# Patient Record
Sex: MALE | Race: WHITE | NOT HISPANIC OR LATINO | Employment: FULL TIME | URBAN - METROPOLITAN AREA
[De-identification: names, ages, dates, MRNs, and addresses within clinical notes are randomized per-mention and may not be internally consistent; named-entity substitution may affect disease eponyms.]

---

## 2020-01-30 ENCOUNTER — TRANSCRIBE ORDERS (OUTPATIENT)
Dept: ADMINISTRATIVE | Facility: HOSPITAL | Age: 35
End: 2020-01-30

## 2020-01-30 DIAGNOSIS — R22.9 LOCALIZED MASS: Primary | ICD-10-CM

## 2020-01-31 ENCOUNTER — HOSPITAL ENCOUNTER (OUTPATIENT)
Dept: ULTRASOUND IMAGING | Facility: HOSPITAL | Age: 35
Discharge: HOME | End: 2020-01-31
Payer: COMMERCIAL

## 2020-01-31 DIAGNOSIS — R22.9 LOCALIZED MASS: ICD-10-CM

## 2020-01-31 PROCEDURE — 76882 US LMTD JT/FCL EVL NVASC XTR: CPT

## 2020-06-06 ENCOUNTER — APPOINTMENT (EMERGENCY)
Dept: RADIOLOGY | Facility: HOSPITAL | Age: 35
End: 2020-06-06
Payer: COMMERCIAL

## 2020-06-06 ENCOUNTER — HOSPITAL ENCOUNTER (EMERGENCY)
Facility: HOSPITAL | Age: 35
Discharge: HOME | End: 2020-06-06
Admitting: EMERGENCY MEDICINE
Payer: COMMERCIAL

## 2020-06-06 VITALS
SYSTOLIC BLOOD PRESSURE: 119 MMHG | HEIGHT: 63 IN | RESPIRATION RATE: 16 BRPM | DIASTOLIC BLOOD PRESSURE: 78 MMHG | WEIGHT: 179 LBS | OXYGEN SATURATION: 99 % | TEMPERATURE: 98.1 F | BODY MASS INDEX: 31.71 KG/M2 | HEART RATE: 105 BPM

## 2020-06-06 DIAGNOSIS — R07.9 CHEST PAIN: Primary | ICD-10-CM

## 2020-06-06 DIAGNOSIS — E86.0 DEHYDRATION: ICD-10-CM

## 2020-06-06 LAB
ALBUMIN SERPL BCP-MCNC: 4.1 G/DL (ref 3.5–5)
ALP SERPL-CCNC: 85 U/L (ref 46–116)
ALT SERPL W P-5'-P-CCNC: 35 U/L (ref 12–78)
ANION GAP SERPL CALCULATED.3IONS-SCNC: 9 MMOL/L (ref 4–13)
AST SERPL W P-5'-P-CCNC: 21 U/L (ref 5–45)
BASOPHILS # BLD AUTO: 0.05 THOUSANDS/ΜL (ref 0–0.1)
BASOPHILS NFR BLD AUTO: 1 % (ref 0–1)
BILIRUB SERPL-MCNC: 0.32 MG/DL (ref 0.2–1)
BILIRUB UR QL STRIP: NEGATIVE
BUN SERPL-MCNC: 17 MG/DL (ref 5–25)
CALCIUM SERPL-MCNC: 8.6 MG/DL (ref 8.3–10.1)
CHLORIDE SERPL-SCNC: 102 MMOL/L (ref 100–108)
CLARITY UR: CLEAR
CO2 SERPL-SCNC: 28 MMOL/L (ref 21–32)
COLOR UR: YELLOW
CREAT SERPL-MCNC: 1.17 MG/DL (ref 0.6–1.3)
EOSINOPHIL # BLD AUTO: 0.1 THOUSAND/ΜL (ref 0–0.61)
EOSINOPHIL NFR BLD AUTO: 1 % (ref 0–6)
ERYTHROCYTE [DISTWIDTH] IN BLOOD BY AUTOMATED COUNT: 12.3 % (ref 11.6–15.1)
GFR SERPL CREATININE-BSD FRML MDRD: 80 ML/MIN/1.73SQ M
GLUCOSE SERPL-MCNC: 123 MG/DL (ref 65–140)
GLUCOSE UR STRIP-MCNC: NEGATIVE MG/DL
HCT VFR BLD AUTO: 46.3 % (ref 36.5–49.3)
HGB BLD-MCNC: 15.5 G/DL (ref 12–17)
HGB UR QL STRIP.AUTO: NEGATIVE
IMM GRANULOCYTES # BLD AUTO: 0.03 THOUSAND/UL (ref 0–0.2)
IMM GRANULOCYTES NFR BLD AUTO: 0 % (ref 0–2)
KETONES UR STRIP-MCNC: NEGATIVE MG/DL
LEUKOCYTE ESTERASE UR QL STRIP: NEGATIVE
LYMPHOCYTES # BLD AUTO: 1.25 THOUSANDS/ΜL (ref 0.6–4.47)
LYMPHOCYTES NFR BLD AUTO: 12 % (ref 14–44)
MAGNESIUM SERPL-MCNC: 1.9 MG/DL (ref 1.6–2.6)
MCH RBC QN AUTO: 29.8 PG (ref 26.8–34.3)
MCHC RBC AUTO-ENTMCNC: 33.5 G/DL (ref 31.4–37.4)
MCV RBC AUTO: 89 FL (ref 82–98)
MONOCYTES # BLD AUTO: 0.58 THOUSAND/ΜL (ref 0.17–1.22)
MONOCYTES NFR BLD AUTO: 6 % (ref 4–12)
NEUTROPHILS # BLD AUTO: 8.1 THOUSANDS/ΜL (ref 1.85–7.62)
NEUTS SEG NFR BLD AUTO: 80 % (ref 43–75)
NITRITE UR QL STRIP: NEGATIVE
NRBC BLD AUTO-RTO: 0 /100 WBCS
PH UR STRIP.AUTO: 6 [PH]
PLATELET # BLD AUTO: 231 THOUSANDS/UL (ref 149–390)
PMV BLD AUTO: 10.8 FL (ref 8.9–12.7)
POTASSIUM SERPL-SCNC: 3.7 MMOL/L (ref 3.5–5.3)
PROT SERPL-MCNC: 7.7 G/DL (ref 6.4–8.2)
PROT UR STRIP-MCNC: NEGATIVE MG/DL
RBC # BLD AUTO: 5.2 MILLION/UL (ref 3.88–5.62)
SODIUM SERPL-SCNC: 139 MMOL/L (ref 136–145)
SP GR UR STRIP.AUTO: 1.01 (ref 1–1.03)
TROPONIN I SERPL-MCNC: 0.02 NG/ML
TROPONIN I SERPL-MCNC: 0.04 NG/ML
TSH SERPL DL<=0.05 MIU/L-ACNC: 10.62 UIU/ML (ref 0.36–3.74)
UROBILINOGEN UR QL STRIP.AUTO: 0.2 E.U./DL
WBC # BLD AUTO: 10.11 THOUSAND/UL (ref 4.31–10.16)

## 2020-06-06 PROCEDURE — 99284 EMERGENCY DEPT VISIT MOD MDM: CPT | Performed by: PHYSICIAN ASSISTANT

## 2020-06-06 PROCEDURE — 84443 ASSAY THYROID STIM HORMONE: CPT | Performed by: PHYSICIAN ASSISTANT

## 2020-06-06 PROCEDURE — 36415 COLL VENOUS BLD VENIPUNCTURE: CPT | Performed by: PHYSICIAN ASSISTANT

## 2020-06-06 PROCEDURE — 99285 EMERGENCY DEPT VISIT HI MDM: CPT

## 2020-06-06 PROCEDURE — 81003 URINALYSIS AUTO W/O SCOPE: CPT | Performed by: PHYSICIAN ASSISTANT

## 2020-06-06 PROCEDURE — 84484 ASSAY OF TROPONIN QUANT: CPT | Performed by: PHYSICIAN ASSISTANT

## 2020-06-06 PROCEDURE — 85025 COMPLETE CBC W/AUTO DIFF WBC: CPT | Performed by: PHYSICIAN ASSISTANT

## 2020-06-06 PROCEDURE — 71045 X-RAY EXAM CHEST 1 VIEW: CPT

## 2020-06-06 PROCEDURE — 96360 HYDRATION IV INFUSION INIT: CPT

## 2020-06-06 PROCEDURE — 80053 COMPREHEN METABOLIC PANEL: CPT | Performed by: PHYSICIAN ASSISTANT

## 2020-06-06 PROCEDURE — 93005 ELECTROCARDIOGRAM TRACING: CPT

## 2020-06-06 PROCEDURE — 83735 ASSAY OF MAGNESIUM: CPT | Performed by: PHYSICIAN ASSISTANT

## 2020-06-06 RX ORDER — ASPIRIN 81 MG/1
324 TABLET, CHEWABLE ORAL ONCE
Status: COMPLETED | OUTPATIENT
Start: 2020-06-06 | End: 2020-06-06

## 2020-06-06 RX ADMIN — SODIUM CHLORIDE 1000 ML: 0.9 INJECTION, SOLUTION INTRAVENOUS at 15:50

## 2020-06-06 RX ADMIN — ASPIRIN 81 MG 324 MG: 81 TABLET ORAL at 15:44

## 2020-06-08 LAB
ATRIAL RATE: 91 BPM
P AXIS: 69 DEGREES
PR INTERVAL: 154 MS
QRS AXIS: 113 DEGREES
QRSD INTERVAL: 90 MS
QT INTERVAL: 352 MS
QTC INTERVAL: 432 MS
T WAVE AXIS: 36 DEGREES
VENTRICULAR RATE: 91 BPM

## 2020-06-08 PROCEDURE — 93010 ELECTROCARDIOGRAM REPORT: CPT | Performed by: INTERNAL MEDICINE

## 2021-03-10 DIAGNOSIS — Z23 ENCOUNTER FOR IMMUNIZATION: ICD-10-CM

## 2021-09-13 ENCOUNTER — OFFICE VISIT (OUTPATIENT)
Dept: URGENT CARE | Facility: CLINIC | Age: 36
End: 2021-09-13
Payer: COMMERCIAL

## 2021-09-13 VITALS
BODY MASS INDEX: 31.01 KG/M2 | WEIGHT: 175 LBS | RESPIRATION RATE: 16 BRPM | TEMPERATURE: 97 F | HEART RATE: 102 BPM | OXYGEN SATURATION: 100 % | HEIGHT: 63 IN

## 2021-09-13 DIAGNOSIS — R68.89 FLU-LIKE SYMPTOMS: Primary | ICD-10-CM

## 2021-09-13 PROCEDURE — G0382 LEV 3 HOSP TYPE B ED VISIT: HCPCS | Performed by: EMERGENCY MEDICINE

## 2021-09-13 PROCEDURE — S9083 URGENT CARE CENTER GLOBAL: HCPCS | Performed by: EMERGENCY MEDICINE

## 2021-09-13 PROCEDURE — U0003 INFECTIOUS AGENT DETECTION BY NUCLEIC ACID (DNA OR RNA); SEVERE ACUTE RESPIRATORY SYNDROME CORONAVIRUS 2 (SARS-COV-2) (CORONAVIRUS DISEASE [COVID-19]), AMPLIFIED PROBE TECHNIQUE, MAKING USE OF HIGH THROUGHPUT TECHNOLOGIES AS DESCRIBED BY CMS-2020-01-R: HCPCS | Performed by: EMERGENCY MEDICINE

## 2021-09-13 PROCEDURE — U0005 INFEC AGEN DETEC AMPLI PROBE: HCPCS | Performed by: EMERGENCY MEDICINE

## 2021-09-13 RX ORDER — PROMETHAZINE HYDROCHLORIDE 25 MG/1
TABLET ORAL
COMMUNITY
Start: 2021-05-17

## 2021-09-13 RX ORDER — LEVOTHYROXINE SODIUM 112 MCG
TABLET ORAL
COMMUNITY
Start: 2021-08-25

## 2021-09-13 NOTE — PATIENT INSTRUCTIONS
urinating less or not at all  · You have a headache with a stiff neck, and you feel tired or confused  · You have new pain or pressure in your chest     · Your symptoms, such as shortness of breath, vomiting, or diarrhea, get worse  · Your symptoms, such as fever and coughing, seem to get better, but then get worse  Contact your healthcare provider if:   · You have new muscle pain or weakness  · You have questions or concerns about your condition or care  Treatment for influenza  may include any of the following:  · Acetaminophen  decreases pain and fever  It is available without a doctor's order  Ask how much to take and how often to take it  Follow directions  Acetaminophen can cause liver damage if not taken correctly  · NSAIDs , such as ibuprofen, help decrease swelling, pain, and fever  This medicine is available with or without a doctor's order  NSAIDs can cause stomach bleeding or kidney problems in certain people  If you take blood thinner medicine, always ask your healthcare provider if NSAIDs are safe for you  Always read the medicine label and follow directions  · Antivirals  help fight a viral infection  Manage your symptoms:   · Rest  as much as you can to help you recover  · Drink liquids as directed  to help prevent dehydration  Ask how much liquid to drink each day and which liquids are best for you  Prevent the spread of the flu:   · Wash your hands often  Use soap and water  Wash your hands after you use the bathroom, change a child's diapers, or sneeze  Wash your hands before you prepare or eat food  Use gel hand cleanser when soap and water are not available  Do not touch your eyes, nose, or mouth unless you have washed your hands first        · Cover your mouth when you sneeze or cough  Cough into a tissue or the bend of your arm  · Clean shared items with a germ-killing   Clean table surfaces, doorknobs, and light switches   Do not share towels, silverware, and dishes with people who are sick  Wash bed sheets, towels, silverware, and dishes with soap and water  · Wear a mask  over your mouth and nose if you are sick or are near anyone who is sick  · Stay away from others  if you are sick  · Influenza vaccine  helps prevent influenza (flu)  Everyone older than 6 months should get a yearly influenza vaccine  Get the vaccine as soon as it is available, usually in September or October each year  Follow up with your healthcare provider as directed:  Write down your questions so you remember to ask them during your visits  © 2017 [address] Information is for End User's use only and may not be sold, redistributed or otherwise used for commercial purposes  All illustrations and images included in CareNotes® are the copyrighted property of [employer]  or [employer] Beard  The above information is an  only  It is not intended as medical advice for individual conditions or treatments  Talk to your doctor, nurse or pharmacist before following any medical regimen to see if it is safe and effective for you  [address]     Your healthcare provider and/or public health staff have evaluated you and have determined that you do not need to be hospitalized at this time  At this time you can be isolated at home where you will be monitored by staff from your local or state health department  You should carefully follow the prevention and isolation steps below until a healthcare provider or local or state health department says that you can return to your normal activities  Stay home except to get medical care     People who are mildly ill with COVID-19 are able to isolate at home during their illness  You should restrict activities outside your home, except for getting medical care  Do not go to work, school, or public areas   Avoid using public transportation, ride-sharing, or taxis  Separate yourself from other people and animals in your home     People: As much as possible, you should stay in a specific room and away from other people in your home  Also, you should use a separate bathroom, if available  Animals: You should restrict contact with pets and other animals while you are sick with COVID-19, just like you would around other people  Although there have not been reports of pets or other animals becoming sick with COVID-19, it is still recommended that people sick with COVID-19 limit contact with animals until more information is known about the virus  When possible, have another member of your household care for your animals while you are sick  If you are sick with COVID-19, avoid contact with your pet, including petting, snuggling, being kissed or licked, and sharing food  If you must care for your pet or be around animals while you are sick, wash your hands before and after you interact with pets and wear a facemask  See COVID-19 and Animals for more information  Call ahead before visiting your doctor     If you have a medical appointment, call the healthcare provider and tell them that you have or may have COVID-19  This will help the healthcare providers office take steps to keep other people from getting infected or exposed  Wear a facemask     You should wear a facemask when you are around other people (e g , sharing a room or vehicle) or pets and before you enter a healthcare providers office  If you are not able to wear a facemask (for example, because it causes trouble breathing), then people who live with you should not stay in the same room with you, or they should wear a facemask if they enter your room  Cover your coughs and sneezes     Cover your mouth and nose with a tissue when you cough or sneeze  Throw used tissues in a lined trash can   Immediately wash your hands with soap and water for at least 20 seconds or, if soap and water are not available, clean your hands with an alcohol-based hand  that contains at least 60% alcohol  Clean your hands often     Wash your hands often with soap and water for at least 20 seconds, especially after blowing your nose, coughing, or sneezing; going to the bathroom; and before eating or preparing food  If soap and water are not readily available, use an alcohol-based hand  with at least 60% alcohol, covering all surfaces of your hands and rubbing them together until they feel dry  Soap and water are the best option if hands are visibly dirty  Avoid touching your eyes, nose, and mouth with unwashed hands  Avoid sharing personal household items     You should not share dishes, drinking glasses, cups, eating utensils, towels, or bedding with other people or pets in your home  After using these items, they should be washed thoroughly with soap and water  Clean all high-touch surfaces everyday     High touch surfaces include counters, tabletops, doorknobs, bathroom fixtures, toilets, phones, keyboards, tablets, and bedside tables  Also, clean any surfaces that may have blood, stool, or body fluids on them  Use a household cleaning spray or wipe, according to the label instructions  Labels contain instructions for safe and effective use of the cleaning product including precautions you should take when applying the product, such as wearing gloves and making sure you have good ventilation during use of the product  Monitor your symptoms     Seek prompt medical attention if your illness is worsening (e g , difficulty breathing)  Before seeking care, call your healthcare provider and tell them that you have, or are being evaluated for, COVID-19  Put on a facemask before you enter the facility  These steps will help the healthcare providers office to keep other people in the office or waiting room from getting infected or exposed   Ask your healthcare provider to call the local or state health department  Persons who are placed under active monitoring or facilitated self-monitoring should follow instructions provided by their local health department or occupational health professionals, as appropriate  If you have a medical emergency and need to call 911, notify the dispatch personnel that you have, or are being evaluated for COVID-19  If possible, put on a facemask before emergency medical services arrive  Discontinuing home isolation     Patients with confirmed COVID-19 should remain under home isolation precautions until the risk of secondary transmission to others is thought to be low  The decision to discontinue home isolation precautions should be made on a case-by-case basis, in consultation with healthcare providers and state and local health departments       Source: [employer]        Proceed to ER if symptoms worsen

## 2021-09-13 NOTE — PROGRESS NOTES
[facility]        NAME: Ariel Oh is a 43 y o  male  : 1985    MRN: [id number]  DATE: 2021  TIME: 7:58 PM    Assessment and Plan   Flu-like symptoms [R68 89]  1  Flu-like symptoms  Novel Coronavirus (Covid-19),PCR [facility] - Office Collection         Patient Instructions     Patient Instructions     You have been diagnosed with a flu-like illness, and your symptoms should resolve over the next 7 to 10 days with the treatments recommended today  If they do not, it is possible that you have developed a bacterial infection and you should return  If you were to take an antibiotic while you are still in the viral stage, you will not get better any faster, but could kill off  good germs in your body as well as make germs  resistant to the antibiotic  Take an expectorant - guaifenesin should be the only ingredient - during the day, and the cough suppressant (ex  Robitussin DM or Tessalon) if needed at night only  Take Zinc 12 5 to 15 mg every 2 - 3 hrs while awake for the next few days  You may take Cold Malik (13 3 mg of Zinc) or split a 25 mg Zinc tablet or lozenge in two or a 50 mg into four to get the proper dose  The total daily dose of Zinc should exceed 75 mg per day  You should also take Quercetin 500 mg twice daily  May take Flonase as discussed  You may also take vitamin D 3 2000 i u s per day for the next 1 week  You may also take a decongestant like Sudafed, unless you have hypertension or cardiac disease  You may take Imodium for diarrhea according to package instructions  Flu-like illness   AMBULATORY CARE:   Flu-like illness is an infection caused by a virus  The flu is easily spread when an infected person coughs, sneezes, or has close contact with others  You may be able to spread the flu to others for 1 week or longer after signs or symptoms appear     Common signs and symptoms include the following:   · Fever and chills    · Headaches, body aches, and muscle or joint pain    · Cough, runny nose, and sore throat    · Loss of appetite, nausea, vomiting, or diarrhea    · Tiredness    · Trouble breathing  Call 911 for any of the following:   · You have trouble breathing, and your lips look purple or blue  · You have a seizure  Seek care immediately if:   · You are dizzy, or you are urinating less or not at all  · You have a headache with a stiff neck, and you feel tired or confused  · You have new pain or pressure in your chest     · Your symptoms, such as shortness of breath, vomiting, or diarrhea, get worse  · Your symptoms, such as fever and coughing, seem to get better, but then get worse  Contact your healthcare provider if:   · You have new muscle pain or weakness  · You have questions or concerns about your condition or care  Treatment for influenza  may include any of the following:  · Acetaminophen  decreases pain and fever  It is available without a doctor's order  Ask how much to take and how often to take it  Follow directions  Acetaminophen can cause liver damage if not taken correctly  · NSAIDs , such as ibuprofen, help decrease swelling, pain, and fever  This medicine is available with or without a doctor's order  NSAIDs can cause stomach bleeding or kidney problems in certain people  If you take blood thinner medicine, always ask your healthcare provider if NSAIDs are safe for you  Always read the medicine label and follow directions  · Antivirals  help fight a viral infection  Manage your symptoms:   · Rest  as much as you can to help you recover  · Drink liquids as directed  to help prevent dehydration  Ask how much liquid to drink each day and which liquids are best for you  Prevent the spread of the flu:   · Wash your hands often  Use soap and water  Wash your hands after you use the bathroom, change a child's diapers, or sneeze  Wash your hands before you prepare or eat food   Use gel hand cleanser when soap and water are not available  Do not touch your eyes, nose, or mouth unless you have washed your hands first        · Cover your mouth when you sneeze or cough  Cough into a tissue or the bend of your arm  · Clean shared items with a germ-killing   Clean table surfaces, doorknobs, and light switches  Do not share towels, silverware, and dishes with people who are sick  Wash bed sheets, towels, silverware, and dishes with soap and water  · Wear a mask  over your mouth and nose if you are sick or are near anyone who is sick  · Stay away from others  if you are sick  · Influenza vaccine  helps prevent influenza (flu)  Everyone older than 6 months should get a yearly influenza vaccine  Get the vaccine as soon as it is available, usually in September or October each year  Follow up with your healthcare provider as directed:  Write down your questions so you remember to ask them during your visits  © 2017 [address] Information is for End User's use only and may not be sold, redistributed or otherwise used for commercial purposes  All illustrations and images included in CareNotes® are the copyrighted property of [employer]  or [employer]  The above information is an  only  It is not intended as medical advice for individual conditions or treatments  Talk to your doctor, nurse or pharmacist before following any medical regimen to see if it is safe and effective for you  [address]     Your healthcare provider and/or public health staff have evaluated you and have determined that you do not need to be hospitalized at this time  At this time you can be isolated at home where you will be monitored by staff from your local or state health department  You should carefully follow the prevention and isolation steps below until a healthcare provider or local or state health department says that you can return to your normal activities  Stay home except to get medical care     People who are mildly ill with COVID-19 are able to isolate at home during their illness  You should restrict activities outside your home, except for getting medical care  Do not go to work, school, or public areas  Avoid using public transportation, ride-sharing, or taxis  Separate yourself from other people and animals in your home     People: As much as possible, you should stay in a specific room and away from other people in your home  Also, you should use a separate bathroom, if available  Animals: You should restrict contact with pets and other animals while you are sick with COVID-19, just like you would around other people  Although there have not been reports of pets or other animals becoming sick with COVID-19, it is still recommended that people sick with COVID-19 limit contact with animals until more information is known about the virus  When possible, have another member of your household care for your animals while you are sick  If you are sick with COVID-19, avoid contact with your pet, including petting, snuggling, being kissed or licked, and sharing food  If you must care for your pet or be around animals while you are sick, wash your hands before and after you interact with pets and wear a facemask  See COVID-19 and Animals for more information  Call ahead before visiting your doctor     If you have a medical appointment, call the healthcare provider and tell them that you have or may have COVID-19  This will help the healthcare providers office take steps to keep other people from getting infected or exposed  Wear a facemask     You should wear a facemask when you are around other people (e g , sharing a room or vehicle) or pets and before you enter a healthcare providers office   If you are not able to wear a facemask (for example, because it causes trouble breathing), then people who live with you should not stay in the same room with you, or they should wear a facemask if they enter your room  Cover your coughs and sneezes     Cover your mouth and nose with a tissue when you cough or sneeze  Throw used tissues in a lined trash can  Immediately wash your hands with soap and water for at least 20 seconds or, if soap and water are not available, clean your hands with an alcohol-based hand  that contains at least 60% alcohol  Clean your hands often     Wash your hands often with soap and water for at least 20 seconds, especially after blowing your nose, coughing, or sneezing; going to the bathroom; and before eating or preparing food  If soap and water are not readily available, use an alcohol-based hand  with at least 60% alcohol, covering all surfaces of your hands and rubbing them together until they feel dry  Soap and water are the best option if hands are visibly dirty  Avoid touching your eyes, nose, and mouth with unwashed hands  Avoid sharing personal household items     You should not share dishes, drinking glasses, cups, eating utensils, towels, or bedding with other people or pets in your home  After using these items, they should be washed thoroughly with soap and water  Clean all high-touch surfaces everyday     High touch surfaces include counters, tabletops, doorknobs, bathroom fixtures, toilets, phones, keyboards, tablets, and bedside tables  Also, clean any surfaces that may have blood, stool, or body fluids on them  Use a household cleaning spray or wipe, according to the label instructions  Labels contain instructions for safe and effective use of the cleaning product including precautions you should take when applying the product, such as wearing gloves and making sure you have good ventilation during use of the product  Monitor your symptoms     Seek prompt medical attention if your illness is worsening (e g , difficulty breathing)   Before seeking care, call your healthcare provider and tell them that you have, or are being evaluated for, COVID-19  Put on a facemask before you enter the facility  These steps will help the healthcare providers office to keep other people in the office or waiting room from getting infected or exposed  Ask your healthcare provider to call the local or state health department  Persons who are placed under active monitoring or facilitated self-monitoring should follow instructions provided by their local health department or occupational health professionals, as appropriate  If you have a medical emergency and need to call 911, notify the dispatch personnel that you have, or are being evaluated for COVID-19  If possible, put on a facemask before emergency medical services arrive  Discontinuing home isolation     Patients with confirmed COVID-19 should remain under home isolation precautions until the risk of secondary transmission to others is thought to be low  The decision to discontinue home isolation precautions should be made on a case-by-case basis, in consultation with healthcare providers and [facility] and [facility] health departments  Source: [employer]        Proceed to ER if symptoms worsen        Follow up with PCP in 3-5 days  Proceed to  ER if symptoms worsen  Chief Complaint   No chief complaint on file  History of Present Illness       Patient complains of nausea, onset after COVID exposure  Review of Systems   Review of Systems   Constitutional: Negative for chills and fever  HENT: Negative for congestion, ear discharge, hearing loss, rhinorrhea and sinus pressure  Respiratory: Negative for cough, chest tightness, shortness of breath and wheezing  Gastrointestinal: Positive for nausea  Negative for abdominal pain, diarrhea and vomiting  Musculoskeletal: Negative for neck stiffness  Skin: Negative for pallor  Neurological: Negative for headaches  Current Medications     No current outpatient medications on file  Current Allergies     Allergies as of 09/13/2021    (No Known Allergies)            The following portions of the patient's history were reviewed and updated as appropriate: allergies, current medications, past family history, past medical history, past social history, past surgical history and problem list      No past medical history on file  No past surgical history on file  No family history on file  Medications have been verified  Objective   There were no vitals taken for this visit  Physical Exam     Physical Exam  Vitals and nursing note reviewed  Constitutional:       General: He is not in acute distress  Appearance: He is well-developed  He is not diaphoretic  HENT:      Head: Normocephalic and atraumatic  Nose: Mucosal edema and rhinorrhea present  Mouth/Throat:      Pharynx: Posterior oropharyngeal erythema present  Eyes:      Conjunctiva/sclera: Conjunctivae normal       Pupils: Pupils are equal, round, and reactive to light  Cardiovascular:      Rate and Rhythm: Normal rate and regular rhythm  Pulmonary:      Effort: Pulmonary effort is normal  No respiratory distress  Breath sounds: Normal breath sounds  Musculoskeletal:      Cervical back: Neck supple  Lymphadenopathy:      Cervical: No cervical adenopathy  Skin:     General: Skin is warm and dry  Coloration: Skin is not pale  Neurological:      Mental Status: He is alert and oriented to person, place, and time  Psychiatric:         Mood and Affect: Mood normal          Behavior: Behavior normal          Thought Content:  Thought content normal          Judgment: Judgment normal

## 2021-09-14 LAB — SARS-COV-2 RNA RESP QL NAA+PROBE: NEGATIVE

## 2022-02-22 ENCOUNTER — APPOINTMENT (EMERGENCY)
Dept: CT IMAGING | Facility: HOSPITAL | Age: 37
End: 2022-02-22
Payer: COMMERCIAL

## 2022-02-22 ENCOUNTER — HOSPITAL ENCOUNTER (EMERGENCY)
Facility: HOSPITAL | Age: 37
Discharge: HOME | End: 2022-02-22
Attending: EMERGENCY MEDICINE | Admitting: EMERGENCY MEDICINE
Payer: COMMERCIAL

## 2022-02-22 VITALS
HEIGHT: 63 IN | BODY MASS INDEX: 31.01 KG/M2 | SYSTOLIC BLOOD PRESSURE: 123 MMHG | HEART RATE: 82 BPM | TEMPERATURE: 97.5 F | DIASTOLIC BLOOD PRESSURE: 63 MMHG | WEIGHT: 175 LBS | RESPIRATION RATE: 22 BRPM | OXYGEN SATURATION: 98 %

## 2022-02-22 DIAGNOSIS — R20.2 PARESTHESIAS: Primary | ICD-10-CM

## 2022-02-22 DIAGNOSIS — R51.9 HEADACHE: ICD-10-CM

## 2022-02-22 LAB
ALBUMIN SERPL BCP-MCNC: 4 G/DL (ref 3.5–5)
ALP SERPL-CCNC: 80 U/L (ref 46–116)
ALT SERPL W P-5'-P-CCNC: 47 U/L (ref 12–78)
ANION GAP SERPL CALCULATED.3IONS-SCNC: 7 MMOL/L (ref 4–13)
APTT PPP: 29 SECONDS (ref 23–37)
AST SERPL W P-5'-P-CCNC: 27 U/L (ref 5–45)
BASOPHILS # BLD AUTO: 0.07 THOUSANDS/ΜL (ref 0–0.1)
BASOPHILS NFR BLD AUTO: 1 % (ref 0–1)
BILIRUB SERPL-MCNC: 0.31 MG/DL (ref 0.2–1)
BUN SERPL-MCNC: 14 MG/DL (ref 5–25)
CALCIUM SERPL-MCNC: 8.9 MG/DL (ref 8.3–10.1)
CHLORIDE SERPL-SCNC: 103 MMOL/L (ref 100–108)
CO2 SERPL-SCNC: 29 MMOL/L (ref 21–32)
CREAT SERPL-MCNC: 1.01 MG/DL (ref 0.6–1.3)
EOSINOPHIL # BLD AUTO: 0.25 THOUSAND/ΜL (ref 0–0.61)
EOSINOPHIL NFR BLD AUTO: 3 % (ref 0–6)
ERYTHROCYTE [DISTWIDTH] IN BLOOD BY AUTOMATED COUNT: 12.3 % (ref 11.6–15.1)
GFR SERPL CREATININE-BSD FRML MDRD: 93 ML/MIN/1.73SQ M
GLUCOSE SERPL-MCNC: 89 MG/DL (ref 65–140)
HCT VFR BLD AUTO: 47.3 % (ref 36.5–49.3)
HGB BLD-MCNC: 16.2 G/DL (ref 12–17)
IMM GRANULOCYTES # BLD AUTO: 0.02 THOUSAND/UL (ref 0–0.2)
IMM GRANULOCYTES NFR BLD AUTO: 0 % (ref 0–2)
INR PPP: 0.88 (ref 0.84–1.19)
LYMPHOCYTES # BLD AUTO: 2.1 THOUSANDS/ΜL (ref 0.6–4.47)
LYMPHOCYTES NFR BLD AUTO: 27 % (ref 14–44)
MCH RBC QN AUTO: 30.6 PG (ref 26.8–34.3)
MCHC RBC AUTO-ENTMCNC: 34.2 G/DL (ref 31.4–37.4)
MCV RBC AUTO: 89 FL (ref 82–98)
MONOCYTES # BLD AUTO: 0.67 THOUSAND/ΜL (ref 0.17–1.22)
MONOCYTES NFR BLD AUTO: 8 % (ref 4–12)
NEUTROPHILS # BLD AUTO: 4.82 THOUSANDS/ΜL (ref 1.85–7.62)
NEUTS SEG NFR BLD AUTO: 61 % (ref 43–75)
NRBC BLD AUTO-RTO: 0 /100 WBCS
PLATELET # BLD AUTO: 225 THOUSANDS/UL (ref 149–390)
PMV BLD AUTO: 10.8 FL (ref 8.9–12.7)
POTASSIUM SERPL-SCNC: 4.2 MMOL/L (ref 3.5–5.3)
PROT SERPL-MCNC: 7.5 G/DL (ref 6.4–8.2)
PROTHROMBIN TIME: 11.8 SECONDS (ref 11.6–14.5)
RBC # BLD AUTO: 5.3 MILLION/UL (ref 3.88–5.62)
SODIUM SERPL-SCNC: 139 MMOL/L (ref 136–145)
WBC # BLD AUTO: 7.93 THOUSAND/UL (ref 4.31–10.16)

## 2022-02-22 PROCEDURE — 85610 PROTHROMBIN TIME: CPT | Performed by: EMERGENCY MEDICINE

## 2022-02-22 PROCEDURE — 80053 COMPREHEN METABOLIC PANEL: CPT | Performed by: EMERGENCY MEDICINE

## 2022-02-22 PROCEDURE — 85730 THROMBOPLASTIN TIME PARTIAL: CPT | Performed by: EMERGENCY MEDICINE

## 2022-02-22 PROCEDURE — 96360 HYDRATION IV INFUSION INIT: CPT

## 2022-02-22 PROCEDURE — 99284 EMERGENCY DEPT VISIT MOD MDM: CPT

## 2022-02-22 PROCEDURE — 70498 CT ANGIOGRAPHY NECK: CPT

## 2022-02-22 PROCEDURE — 99284 EMERGENCY DEPT VISIT MOD MDM: CPT | Performed by: EMERGENCY MEDICINE

## 2022-02-22 PROCEDURE — 85025 COMPLETE CBC W/AUTO DIFF WBC: CPT | Performed by: EMERGENCY MEDICINE

## 2022-02-22 PROCEDURE — 36415 COLL VENOUS BLD VENIPUNCTURE: CPT | Performed by: EMERGENCY MEDICINE

## 2022-02-22 PROCEDURE — 70496 CT ANGIOGRAPHY HEAD: CPT

## 2022-02-22 PROCEDURE — 93005 ELECTROCARDIOGRAM TRACING: CPT

## 2022-02-22 RX ADMIN — SODIUM CHLORIDE 1000 ML: 0.9 INJECTION, SOLUTION INTRAVENOUS at 18:36

## 2022-02-22 RX ADMIN — IOHEXOL 85 ML: 350 INJECTION, SOLUTION INTRAVENOUS at 19:06

## 2022-02-23 NOTE — ED PROVIDER NOTES
History  Chief Complaint   Patient presents with    Numbness     Patient c/o right sided face and mouth numbness and right 3rd, 4th and 5th fingers, vision changes  C/O dizziness  Symptoms started at 1530  Symptoms now resolved approx 45 min ago  41-year-old male complains of bilateral visual spots likes sworls occurring while driving approximately 3:30 p m , lasted about 15-20 minutes and then subacute onset bilateral frontotemporal mild to moderate headache, minimally persists  Notes approximately 45 minutes prior to arrival/5:00 p m  Began having right lower facial/right tongue tingling/numbness, felt panicky, had difficulty comprehending work texts and responding for 1-2 minutes, concerned having stroke  No history of stroke, CAD or VTE  Past medical history includes hypothyroidism, headaches/migraines  Notes few months ago had episode of same type of visual changes/headache and bleed related to thyroid medicine dosing  No family history of stroke or CAD  History provided by:  Patient  CVA/TIA-like Symptoms  Presenting symptoms: headaches, language symptoms and visual change    Onset quality:  Gradual  Progression:  Resolved  Similar to previous episodes: yes    Associated symptoms: paresthesias    Associated symptoms: no fever, no nausea, no vertigo and no vomiting        Prior to Admission Medications   Prescriptions Last Dose Informant Patient Reported? Taking?    Multiple Vitamins-Iron TABS   Yes No   Sig: Take by mouth   Synthroid 112 MCG tablet   Yes No   Sig: TAKE 1 TABLET BY MOUTH EVERY DAY AT LEAST 30 MIN BEFORE BREAKFAST OR OTHER MEDICATION      Facility-Administered Medications: None       Past Medical History:   Diagnosis Date    Disease of thyroid gland        Past Surgical History:   Procedure Laterality Date    NO PAST SURGERIES         Family History   Problem Relation Age of Onset    No Known Problems Mother     No Known Problems Father      I have reviewed and agree with the history as documented  E-Cigarette/Vaping    E-Cigarette Use Never User      E-Cigarette/Vaping Substances     Social History     Tobacco Use    Smoking status: Current Every Day Smoker     Packs/day: 0 50     Types: Cigarettes    Smokeless tobacco: Never Used   Vaping Use    Vaping Use: Never used   Substance Use Topics    Alcohol use: Yes     Comment: occasionally     Drug use: Not Currently       Review of Systems   Constitutional: Negative for fever  Gastrointestinal: Negative for nausea and vomiting  Neurological: Positive for headaches and paresthesias  Negative for vertigo  All other systems reviewed and are negative  Physical Exam  Physical Exam  Vitals and nursing note reviewed  Constitutional:       Comments: Pleasant, comfortable-appearing   HENT:      Head: Normocephalic and atraumatic  Eyes:      Conjunctiva/sclera: Conjunctivae normal       Pupils: Pupils are equal, round, and reactive to light  Cardiovascular:      Rate and Rhythm: Normal rate and regular rhythm  Heart sounds: Normal heart sounds  Pulmonary:      Effort: Pulmonary effort is normal       Breath sounds: Normal breath sounds  Abdominal:      General: Bowel sounds are normal  There is no distension  Palpations: Abdomen is soft  Tenderness: There is no abdominal tenderness  Musculoskeletal:         General: Normal range of motion  Cervical back: Neck supple  Skin:     General: Skin is warm and dry  Neurological:      General: No focal deficit present  Mental Status: He is alert and oriented to person, place, and time  Mental status is at baseline  Cranial Nerves: No cranial nerve deficit  Sensory: No sensory deficit  Motor: No weakness  Coordination: Coordination normal    Psychiatric:         Behavior: Behavior normal          Thought Content:  Thought content normal          Judgment: Judgment normal          Vital Signs  ED Triage Vitals [02/22/22 8628] Temperature Pulse Respirations Blood Pressure SpO2   97 5 °F (36 4 °C) 93 18 (!) 172/104 99 %      Temp Source Heart Rate Source Patient Position - Orthostatic VS BP Location FiO2 (%)   Temporal Monitor Sitting Right arm --      Pain Score       No Pain           Vitals:    02/22/22 1718 02/22/22 1845   BP: (!) 172/104 134/84   Pulse: 93 76   Patient Position - Orthostatic VS: Sitting Lying         Visual Acuity  Visual Acuity      Most Recent Value   L Pupil Size (mm) 3   R Pupil Size (mm) 3          ED Medications  Medications   sodium chloride 0 9 % bolus 1,000 mL (1,000 mL Intravenous New Bag 2/22/22 1836)   iohexol (OMNIPAQUE) 350 MG/ML injection (SINGLE-DOSE) 85 mL (85 mL Intravenous Given 2/22/22 1906)       Diagnostic Studies  Results Reviewed     Procedure Component Value Units Date/Time    Comprehensive metabolic panel [[id number]] Collected: 02/22/22 1838    Lab Status: Final result Specimen: Blood from Arm, Left Updated: 02/22/22 1910     Sodium 139 mmol/L      Potassium 4 2 mmol/L      Chloride 103 mmol/L      CO2 29 mmol/L      ANION GAP 7 mmol/L      BUN 14 mg/dL      Creatinine 1 01 mg/dL      Glucose 89 mg/dL      Calcium 8 9 mg/dL      AST 27 U/L      ALT 47 U/L      Alkaline Phosphatase 80 U/L      Total Protein 7 5 g/dL      Albumin 4 0 g/dL      Total Bilirubin 0 31 mg/dL      eGFR 93 ml/min/1 73sq m     Narrative:      [place] guidelines for Chronic Kidney Disease (CKD):     Stage 1 with normal or high GFR (GFR > 90 mL/min/1 73 square meters)    Stage 2 Mild CKD (GFR = 60-89 mL/min/1 73 square meters)    Stage 3A Moderate CKD (GFR = 45-59 mL/min/1 73 square meters)    Stage 3B Moderate CKD (GFR = 30-44 mL/min/1 73 square meters)    Stage 4 Severe CKD (GFR = 15-29 mL/min/1 73 square meters)    Stage 5 End Stage CKD (GFR <15 mL/min/1 73 square meters)  Note: GFR calculation is accurate only with a steady state creatinine    Protime-INR [[id number]]  (Normal) Collected: 02/22/22 1838    Lab Status: Final result Specimen: Blood from Arm, Left Updated: 02/22/22 1857     Protime 11 8 seconds      INR 0 88    APTT [[id number]]  (Normal) Collected: 02/22/22 1838    Lab Status: Final result Specimen: Blood from Arm, Left Updated: 02/22/22 1857     PTT 29 seconds     CBC and differential [[id number]] Collected: 02/22/22 1838    Lab Status: Final result Specimen: Blood from Arm, Left Updated: 02/22/22 1844     WBC 7 93 Thousand/uL      RBC 5 30 Million/uL      Hemoglobin 16 2 g/dL      Hematocrit 47 3 %      MCV 89 fL      MCH 30 6 pg      MCHC 34 2 g/dL      RDW 12 3 %      MPV 10 8 fL      Platelets 942 Thousands/uL      nRBC 0 /100 WBCs      Neutrophils Relative 61 %      Immat GRANS % 0 %      Lymphocytes Relative 27 %      Monocytes Relative 8 %      Eosinophils Relative 3 %      Basophils Relative 1 %      Neutrophils Absolute 4 82 Thousands/µL      Immature Grans Absolute 0 02 Thousand/uL      Lymphocytes Absolute 2 10 Thousands/µL      Monocytes Absolute 0 67 Thousand/µL      Eosinophils Absolute 0 25 Thousand/µL      Basophils Absolute 0 07 Thousands/µL                  CTA head and neck with and without contrast   Final Result by Bethany Christina MD (02/22 1932)         1  No evidence of acute intracranial hemorrhage  2  No evidence of hemodynamic significant stenosis, aneurysm or dissection  Workstation performed: [id number]                    Procedures  Procedures         ED Course  ED Course as of 02/22/22 1946 Tue Feb 22, 2022   1273 Neurology Fior Gonzalesmaría contacted at [facility] 21 no stroke alert, resolved symptoms   1923 EKG 6:42 p m   Normal sinus rhythm rate 73 right axis inferior T-wave inversions no ST elevation or depression interpreted by me   1935 Discussed inpatient care with patient, notes he feels well and discussed with girlfriend, would like outpatient f/u with neurology, will f/u with pmd regarding thyroid                  Stroke Assessment [address] Name 02/22/22 1922             NIH Stroke Scale    Interval Baseline      Level of Consciousness (1a ) 0      LOC Questions (1b ) 0      LOC Commands (1c ) 0      Best Gaze (2 ) 0      Visual (3 ) 0      Facial Palsy (4 ) 0      Motor Arm, Left (5a ) 0      Motor Arm, Right (5b ) 0      Motor Leg, Left (6a ) 0      Motor Leg, Right (6b ) 0      Limb Ataxia (7 ) 0      Sensory (8 ) 0      Best Language (9 ) 0      Dysarthria (10 ) 0      Extinction and Inattention (11 ) (Formerly Neglect) 0      Total 0                            SBIRT 20yo+      Most Recent Value   SBIRT (23 yo +)    In order to provide better care to our patients, we are screening all of our patients for alcohol and drug use  Would it be okay to ask you these screening questions? Yes Filed at: 02/22/2022 0665   Initial Alcohol Screen: US AUDIT-C     1  How often do you have a drink containing alcohol? 0 Filed at: 02/22/2022 1748   2  How many drinks containing alcohol do you have on a typical day you are drinking? 0 Filed at: 02/22/2022 1748   3a  Male UNDER 65: How often do you have five or more drinks on one occasion? 0 Filed at: 02/22/2022 1748   3b  FEMALE Any Age, or MALE 65+: How often do you have 4 or more drinks on one occassion? 0 Filed at: 02/22/2022 1748   Audit-C Score 0 Filed at: 02/22/2022 6281   RAFAEL: How many times in the past year have you    Used an illegal drug or used a prescription medication for non-medical reasons?  Never Filed at: 02/22/2022 6900                    MDM    Disposition  Final diagnoses:   Paresthesias   Headache     Time reflects when diagnosis was documented in both MDM as applicable and the Disposition within this note     Time User Action Codes Description Comment    2/22/2022  7:44 PM Myrle Screws Add [R20 2] Paresthesias     2/22/2022  7:45 PM Myrle Screws Add [R51 9] Headache       ED Disposition     ED Disposition Condition Date/Time Comment    Discharge Stable Tue Feb 22, 2022  7:44 CASSI March discharge to home/self care              Follow-up Information     Follow up With Specialties Details Why Contact Info    Marbella Goode MD Family Medicine   [facility] [address]  [phone]            Patient's Medications   Discharge Prescriptions    No medications on file           PDMP Review     None          ED Provider  Electronically Signed by           Karin Fish DO  02/22/22 1943

## 2022-02-24 LAB
ATRIAL RATE: 73 BPM
P AXIS: 44 DEGREES
PR INTERVAL: 156 MS
QRS AXIS: 102 DEGREES
QRSD INTERVAL: 86 MS
QT INTERVAL: 380 MS
QTC INTERVAL: 418 MS
T WAVE AXIS: 20 DEGREES
VENTRICULAR RATE: 73 BPM

## 2022-09-28 DIAGNOSIS — N50.89 OTHER SPECIFIED DISORDERS OF THE MALE GENITAL ORGANS: ICD-10-CM

## 2022-09-28 DIAGNOSIS — R22.2 LOCALIZED SWELLING, MASS AND LUMP, TRUNK: ICD-10-CM

## 2022-10-07 ENCOUNTER — HOSPITAL ENCOUNTER (OUTPATIENT)
Dept: ULTRASOUND IMAGING | Facility: HOSPITAL | Age: 37
End: 2022-10-07
Payer: COMMERCIAL

## 2022-10-07 ENCOUNTER — APPOINTMENT (OUTPATIENT)
Dept: ULTRASOUND IMAGING | Facility: HOSPITAL | Age: 37
End: 2022-10-07
Payer: COMMERCIAL

## 2022-10-07 DIAGNOSIS — R22.2 LOCALIZED SWELLING, MASS AND LUMP, TRUNK: ICD-10-CM

## 2022-10-07 DIAGNOSIS — N50.89 OTHER SPECIFIED DISORDERS OF THE MALE GENITAL ORGANS: ICD-10-CM

## 2022-10-07 PROCEDURE — 76870 US EXAM SCROTUM: CPT

## 2022-10-07 PROCEDURE — 76705 ECHO EXAM OF ABDOMEN: CPT

## 2022-11-28 ENCOUNTER — APPOINTMENT (OUTPATIENT)
Dept: LAB | Facility: HOSPITAL | Age: 37
End: 2022-11-28

## 2022-11-28 DIAGNOSIS — R06.02 SOB (SHORTNESS OF BREATH): ICD-10-CM

## 2022-11-28 LAB — D DIMER PPP FEU-MCNC: 0.35 UG/ML FEU

## 2024-10-16 ENCOUNTER — APPOINTMENT (EMERGENCY)
Dept: CT IMAGING | Facility: HOSPITAL | Age: 39
End: 2024-10-16
Payer: COMMERCIAL

## 2024-10-16 ENCOUNTER — HOSPITAL ENCOUNTER (OUTPATIENT)
Dept: ULTRASOUND IMAGING | Facility: HOSPITAL | Age: 39
Discharge: HOME | End: 2024-10-16
Payer: COMMERCIAL

## 2024-10-16 ENCOUNTER — HOSPITAL ENCOUNTER (EMERGENCY)
Facility: HOSPITAL | Age: 39
Discharge: HOME | End: 2024-10-16
Attending: EMERGENCY MEDICINE
Payer: COMMERCIAL

## 2024-10-16 VITALS
HEART RATE: 81 BPM | DIASTOLIC BLOOD PRESSURE: 108 MMHG | TEMPERATURE: 97.7 F | BODY MASS INDEX: 27.96 KG/M2 | WEIGHT: 157.85 LBS | OXYGEN SATURATION: 98 % | SYSTOLIC BLOOD PRESSURE: 138 MMHG | RESPIRATION RATE: 16 BRPM

## 2024-10-16 DIAGNOSIS — N45.1 EPIDIDYMITIS: ICD-10-CM

## 2024-10-16 DIAGNOSIS — N50.811 TESTICULAR PAIN, RIGHT: ICD-10-CM

## 2024-10-16 DIAGNOSIS — N43.3 HYDROCELE, UNSPECIFIED: ICD-10-CM

## 2024-10-16 DIAGNOSIS — N43.3 HYDROCELE, UNSPECIFIED HYDROCELE TYPE: Primary | ICD-10-CM

## 2024-10-16 LAB
ALBUMIN SERPL BCG-MCNC: 4.6 G/DL (ref 3.5–5)
ALP SERPL-CCNC: 68 U/L (ref 34–104)
ALT SERPL W P-5'-P-CCNC: 20 U/L (ref 7–52)
ANION GAP SERPL CALCULATED.3IONS-SCNC: 5 MMOL/L (ref 4–13)
AST SERPL W P-5'-P-CCNC: 18 U/L (ref 13–39)
BASOPHILS # BLD AUTO: 0.07 THOUSANDS/ΜL (ref 0–0.1)
BASOPHILS NFR BLD AUTO: 1 % (ref 0–1)
BILIRUB SERPL-MCNC: 0.4 MG/DL (ref 0.2–1)
BILIRUB UR QL STRIP: NEGATIVE
BUN SERPL-MCNC: 15 MG/DL (ref 5–25)
CALCIUM SERPL-MCNC: 9.3 MG/DL (ref 8.4–10.2)
CHLORIDE SERPL-SCNC: 103 MMOL/L (ref 96–108)
CLARITY UR: CLEAR
CO2 SERPL-SCNC: 29 MMOL/L (ref 21–32)
COLOR UR: YELLOW
CREAT SERPL-MCNC: 0.98 MG/DL (ref 0.6–1.3)
EOSINOPHIL # BLD AUTO: 0.06 THOUSAND/ΜL (ref 0–0.61)
EOSINOPHIL NFR BLD AUTO: 1 % (ref 0–6)
ERYTHROCYTE [DISTWIDTH] IN BLOOD BY AUTOMATED COUNT: 12.2 % (ref 11.6–15.1)
GFR SERPL CREATININE-BSD FRML MDRD: 95 ML/MIN/1.73SQ M
GLUCOSE SERPL-MCNC: 90 MG/DL (ref 65–140)
GLUCOSE UR STRIP-MCNC: NEGATIVE MG/DL
HCT VFR BLD AUTO: 47.5 % (ref 36.5–49.3)
HGB BLD-MCNC: 15.8 G/DL (ref 12–17)
HGB UR QL STRIP.AUTO: NEGATIVE
IMM GRANULOCYTES # BLD AUTO: 0.02 THOUSAND/UL (ref 0–0.2)
IMM GRANULOCYTES NFR BLD AUTO: 0 % (ref 0–2)
KETONES UR STRIP-MCNC: NEGATIVE MG/DL
LACTATE SERPL-SCNC: 0.6 MMOL/L (ref 0.5–2)
LEUKOCYTE ESTERASE UR QL STRIP: NEGATIVE
LYMPHOCYTES # BLD AUTO: 1.73 THOUSANDS/ΜL (ref 0.6–4.47)
LYMPHOCYTES NFR BLD AUTO: 27 % (ref 14–44)
MCH RBC QN AUTO: 29.8 PG (ref 26.8–34.3)
MCHC RBC AUTO-ENTMCNC: 33.3 G/DL (ref 31.4–37.4)
MCV RBC AUTO: 90 FL (ref 82–98)
MONOCYTES # BLD AUTO: 0.68 THOUSAND/ΜL (ref 0.17–1.22)
MONOCYTES NFR BLD AUTO: 10 % (ref 4–12)
NEUTROPHILS # BLD AUTO: 3.95 THOUSANDS/ΜL (ref 1.85–7.62)
NEUTS SEG NFR BLD AUTO: 61 % (ref 43–75)
NITRITE UR QL STRIP: NEGATIVE
NRBC BLD AUTO-RTO: 0 /100 WBCS
PH UR STRIP.AUTO: 6.5 [PH]
PLATELET # BLD AUTO: 227 THOUSANDS/UL (ref 149–390)
PMV BLD AUTO: 10.2 FL (ref 8.9–12.7)
POTASSIUM SERPL-SCNC: 4.3 MMOL/L (ref 3.5–5.3)
PROT SERPL-MCNC: 7.4 G/DL (ref 6.4–8.4)
PROT UR STRIP-MCNC: NEGATIVE MG/DL
RBC # BLD AUTO: 5.3 MILLION/UL (ref 3.88–5.62)
SODIUM SERPL-SCNC: 137 MMOL/L (ref 135–147)
SP GR UR STRIP.AUTO: 1.02 (ref 1–1.03)
UROBILINOGEN UR QL STRIP.AUTO: 0.2 E.U./DL
WBC # BLD AUTO: 6.51 THOUSAND/UL (ref 4.31–10.16)

## 2024-10-16 PROCEDURE — 96361 HYDRATE IV INFUSION ADD-ON: CPT

## 2024-10-16 PROCEDURE — 76870 US EXAM SCROTUM: CPT

## 2024-10-16 PROCEDURE — 80053 COMPREHEN METABOLIC PANEL: CPT | Performed by: PHYSICIAN ASSISTANT

## 2024-10-16 PROCEDURE — 81003 URINALYSIS AUTO W/O SCOPE: CPT | Performed by: PHYSICIAN ASSISTANT

## 2024-10-16 PROCEDURE — 96365 THER/PROPH/DIAG IV INF INIT: CPT

## 2024-10-16 PROCEDURE — 96375 TX/PRO/DX INJ NEW DRUG ADDON: CPT

## 2024-10-16 PROCEDURE — 99204 OFFICE O/P NEW MOD 45 MIN: CPT | Performed by: UROLOGY

## 2024-10-16 PROCEDURE — 36415 COLL VENOUS BLD VENIPUNCTURE: CPT | Performed by: PHYSICIAN ASSISTANT

## 2024-10-16 PROCEDURE — 99284 EMERGENCY DEPT VISIT MOD MDM: CPT

## 2024-10-16 PROCEDURE — 83605 ASSAY OF LACTIC ACID: CPT | Performed by: PHYSICIAN ASSISTANT

## 2024-10-16 PROCEDURE — 99285 EMERGENCY DEPT VISIT HI MDM: CPT | Performed by: PHYSICIAN ASSISTANT

## 2024-10-16 PROCEDURE — 85025 COMPLETE CBC W/AUTO DIFF WBC: CPT | Performed by: PHYSICIAN ASSISTANT

## 2024-10-16 PROCEDURE — 72193 CT PELVIS W/DYE: CPT

## 2024-10-16 RX ORDER — OXYCODONE HYDROCHLORIDE 5 MG/1
5 TABLET ORAL ONCE
Status: DISCONTINUED | OUTPATIENT
Start: 2024-10-16 | End: 2024-10-16

## 2024-10-16 RX ORDER — KETOROLAC TROMETHAMINE 30 MG/ML
15 INJECTION, SOLUTION INTRAMUSCULAR; INTRAVENOUS ONCE
Status: COMPLETED | OUTPATIENT
Start: 2024-10-16 | End: 2024-10-16

## 2024-10-16 RX ORDER — CEFTRIAXONE 1 G/50ML
1000 INJECTION, SOLUTION INTRAVENOUS ONCE
Status: COMPLETED | OUTPATIENT
Start: 2024-10-16 | End: 2024-10-16

## 2024-10-16 RX ORDER — SULFAMETHOXAZOLE AND TRIMETHOPRIM 800; 160 MG/1; MG/1
1 TABLET ORAL ONCE
Status: COMPLETED | OUTPATIENT
Start: 2024-10-16 | End: 2024-10-16

## 2024-10-16 RX ORDER — SULFAMETHOXAZOLE AND TRIMETHOPRIM 800; 160 MG/1; MG/1
1 TABLET ORAL 2 TIMES DAILY
Qty: 14 TABLET | Refills: 0 | Status: SHIPPED | OUTPATIENT
Start: 2024-10-16 | End: 2024-10-23

## 2024-10-16 RX ORDER — OXYCODONE AND ACETAMINOPHEN 5; 325 MG/1; MG/1
1 TABLET ORAL EVERY 6 HOURS PRN
Qty: 12 TABLET | Refills: 0 | Status: SHIPPED | OUTPATIENT
Start: 2024-10-16

## 2024-10-16 RX ADMIN — IOHEXOL 50 ML: 350 INJECTION, SOLUTION INTRAVENOUS at 19:01

## 2024-10-16 RX ADMIN — CEFTRIAXONE 1000 MG: 1 INJECTION, SOLUTION INTRAVENOUS at 22:24

## 2024-10-16 RX ADMIN — SULFAMETHOXAZOLE AND TRIMETHOPRIM 1 TABLET: 800; 160 TABLET ORAL at 22:24

## 2024-10-16 RX ADMIN — KETOROLAC TROMETHAMINE 15 MG: 30 INJECTION, SOLUTION INTRAMUSCULAR; INTRAVENOUS at 18:04

## 2024-10-16 RX ADMIN — SODIUM CHLORIDE 1000 ML: 0.9 INJECTION, SOLUTION INTRAVENOUS at 18:05

## 2024-10-16 NOTE — Clinical Note
Renaldo Mascorro was seen and treated in our emergency department on 10/16/2024.                Diagnosis:     Renaldo  may return to work on return date, is off the rest of the shift today.    He may return on this date: 10/21/2024         If you have any questions or concerns, please don't hesitate to call.      Neptali Baeza PA-C    ______________________________           _______________          _______________  Hospital Representative                              Date                                Time

## 2024-10-17 NOTE — CONSULTS
Consultation - Urology   Renaldo Mascorro 39 y.o. male MRN: [id number]  Unit/Bed#: PRE/POST 14 Encounter: [id number]      Assessment & Plan      Assessment:  Mildly complex right hydrocele/ possible reactive right hydrocele with underlying epididymitis.  No evidence of testicular torsion on either side by exam or ultrasound..  Pain is on the right.  Has been going on for about the past week or 2.  Hydrocele been present for many years.  No issues or pain on the left.    Plan:  Bactrim DS 1 p.o. twice daily for 10 days.  Alternate ibuprofen/Tylenol for pain as needed with food.  Support and ice.  Told patient to be off work for about the next 72 hours.  Follow-up visit in office in 1 to 2 weeks.  Patient could also see his urologist back at [facility] if he so chooses.  He will call and/or return to ER if anything worsens.  Reviewed x-rays/images with patient and wife.  Questions answered.    History of Present Illness   Attending: Maurisio Lala MD  HPI: Renaldo Mascorro is a 39 y.o. year old male who presents with right testicular and scrotal swelling with pain.  This been going on for about the past week or 2.  Patient's had a hydrocele since age 14.  He believes that the the hydrocele increased in size over the past week or 2.  Pain is primarily along the lateral aspect of the hydrocele on the right side.  No fever or chills.  He has been voiding well.  No dysuria.  No history of previous scrotal surgery or vasectomy.  No pain in the left testicle or groin.    Scrotal ultrasound today shows normal blood flow which is symmetric to each testis.  Normal testis on each side.  Complex right hydrocele with some debris.  Left testicle is normal but in a slightly more superior position likely related to slight displacement due to the hydrocele on the right.    CT scan was reviewed as well.  Labs normal      REVIEW OF SYSTEMS  Const: Denies chills, fever and weight loss.  CV: Denies chest pain.  Resp: Denies SOB.  GI:  Denies abdominal pain, nausea and vomiting.  : Denies symptoms other than stated above.  Musculo: Denies back pain.    Historical Information   Past Medical History:   Diagnosis Date    Disease of thyroid gland      Past Surgical History:   Procedure Laterality Date    NO PAST SURGERIES       Social History   Social History     Substance and Sexual Activity   Alcohol Use Yes    Comment: occasionally      E-Cigarette/Vaping    E-Cigarette Use Never User      E-Cigarette/Vaping Substances     Social History     Tobacco Use   Smoking Status Every Day    Current packs/day: 0.50    Types: Cigarettes   Smokeless Tobacco Never         Meds/Allergies   all current active meds have been reviewed, current meds:   Current Facility-Administered Medications:     cefTRIAXone (ROCEPHIN) IVPB (premix in dextrose) 1,000 mg 50 mL, Once    sulfamethoxazole-trimethoprim (BACTRIM DS) 800-160 mg per tablet 1 tablet, Once, and PTA meds:   Prior to Admission Medications   Prescriptions Last Dose Informant Patient Reported? Taking?   Multiple Vitamins-Iron TABS 10/16/2024  Yes Yes   Sig: Take by mouth   Synthroid 112 MCG tablet 10/16/2024  Yes Yes   Sig: TAKE 1 TABLET BY MOUTH EVERY DAY AT LEAST 30 MIN BEFORE BREAKFAST OR OTHER MEDICATION      Facility-Administered Medications: None     No Known Allergies    Objective   Vitals: Blood pressure 144/97, pulse 84, temperature 97.7 °F (36.5 °C), temperature source Temporal, resp. rate 16, weight 71.6 kg (157 lb 13.6 oz), SpO2 98%.    I/O last 24 hours:  In: 1000 [IV Piggyback:1000]  Out: -     Invasive Devices       Peripheral Intravenous Line  Duration             Peripheral IV 10/16/24 Left Antecubital <1 day                    PHYSICAL EXAM  Const: Appears healthy and well developed. No signs of acute distress present.  Resp: Respirations are regular and unlabored.   CV: Rate is regular. Rhythm is regular.  Abdomen: Abdomen is soft, nontender, and nondistended. Kidneys are not  "palpable.  : Phallus is normal and circumcised.  Left testicle is normal.  Normal lie no evidence of torsion.  Nontender no testicular mass no hernia.    There is a right hydrocele which is somewhat tense.  Lateral aspect of hydrocele is somewhat tender.  Somewhat multilocular.  Spermatic cord is normal and there is no evidence of torsion.  Vas deferens on each side is normal no hernia on the right..  Psych: Patient's attitude is cooperative. Mood is normal. Affect is normal.    Lab Results: I have personally reviewed pertinent reports.    CBC:   Lab Results   Component Value Date    WBC 6.51 10/16/2024    HGB 15.8 10/16/2024    HCT 47.5 10/16/2024    MCV 90 10/16/2024     10/16/2024    RBC 5.30 10/16/2024    MCH 29.8 10/16/2024    MCHC 33.3 10/16/2024    RDW 12.2 10/16/2024    MPV 10.2 10/16/2024    NRBC 0 10/16/2024     CMP:   Lab Results   Component Value Date    SODIUM 137 10/16/2024     10/16/2024    CO2 29 10/16/2024    BUN 15 10/16/2024    CREATININE 0.98 10/16/2024    CALCIUM 9.3 10/16/2024    AST 18 10/16/2024    ALT 20 10/16/2024    ALKPHOS 68 10/16/2024    EGFR 95 10/16/2024     Urinalysis:   Lab Results   Component Value Date    COLORU Yellow 10/16/2024    CLARITYU Clear 10/16/2024    SPECGRAV 1.020 10/16/2024    PHUR 6.5 10/16/2024    LEUKOCYTESUR Negative 10/16/2024    NITRITE Negative 10/16/2024    GLUCOSEU Negative 10/16/2024    KETONESU Negative 10/16/2024    BILIRUBINUR Negative 10/16/2024    BLOODU Negative 10/16/2024     Urine Culture: No results found for: \"URINECX\"  PSA: No results found for: \"PSA\"  Imaging Studies: I have personally reviewed pertinent reports.   and I have personally reviewed pertinent films in PACS  EKG, Pathology, and Other Studies: I have personally reviewed pertinent reports.   and I have personally reviewed pertinent films in PACS    Code Status: No Order  Advance Directive and Living Will:      Power of :    POLST:      Counseling / Coordination of " Care  Total floor / unit time spent today 30 minutes. Greater than 50% of total time was spent with the patient and / or family counseling and / or coordination of care. A description of the counseling / coordination of care: Diagnosis and management plan.

## 2024-10-17 NOTE — ED PROVIDER NOTES
Time reflects when diagnosis was documented in both MDM as applicable and the Disposition within this note       Time User Action Codes Description Comment    10/16/2024 10:00 PM Neptali Baeza [N43.3] Hydrocele, unspecified hydrocele type     10/16/2024 10:01 PM Neptali Baeza [N50.811] Testicular pain, right     10/16/2024 10:19 PM Neptali Baeza [N45.1] Epididymitis           ED Disposition       ED Disposition   Discharge    Condition   Stable    Date/Time   Wed Oct 16, 2024 10:17 PM    Comment   Renaldo LORENZANA Ludwin discharge to home/self care.                   Assessment & Plan       Medical Decision Making  The patient is a 39-year-old male presents today with a complaint of right testicular swelling and pain.  The patient states he has had a hydrocele since he was 14.  Patient states over the last few days its become more painful.  Patient had an outpatient ultrasound today and was referred to come to the ER.  The patient's ultrasound results was concerning for a large hydrocele and concern for possible infection versus intermittent torsion.  Patient on examination had pain in the right testicle.  Patient states it has been constant.  Patient did have blood flow on the ultrasound however.  Ultrasound earlier today did recommend urology consult.  Denies any fevers chills penile discharge rash abdominal pain nausea vomiting diarrhea    Patient's lab work overall unremarkable and CT imaging was consistent with ultrasound.  Did speak with urology and Dr. Bingham were provided a consult  The patient was recommended follow-up in the office this time no concern for torsion but was concerning for possible underlying epididymitis under the hydrocele.  Patient was given Rocephin in the emergency department Bactrim for 10 days and was were given a referral.  Patient was also given small pain medication prescription.    Amount and/or Complexity of Data Reviewed  Labs: ordered.  Radiology:  ordered.    Risk  Prescription drug management.             Medications   sodium chloride 0.9 % bolus 1,000 mL (0 mL Intravenous Stopped 10/16/24 1905)   ketorolac (TORADOL) injection 15 mg (15 mg Intravenous Given 10/16/24 1804)   iohexol (OMNIPAQUE) 350 MG/ML injection (MULTI-DOSE) 50 mL (50 mL Intravenous Given 10/16/24 1901)   cefTRIAXone (ROCEPHIN) IVPB (premix in dextrose) 1,000 mg 50 mL (0 mg Intravenous Stopped 10/16/24 2240)   sulfamethoxazole-trimethoprim (BACTRIM DS) 800-160 mg per tablet 1 tablet (1 tablet Oral Given 10/16/24 2224)       ED Risk Strat Scores                           SBIRT 20yo+      Flowsheet Row Most Recent Value   Initial Alcohol Screen: US AUDIT-C     1. How often do you have a drink containing alcohol? 0 Filed at: 10/16/2024 1654   2. How many drinks containing alcohol do you have on a typical day you are drinking?  0 Filed at: 10/16/2024 1654   3a. Male UNDER 65: How often do you have five or more drinks on one occasion? 0 Filed at: 10/16/2024 1654   Audit-C Score 0 Filed at: 10/16/2024 1654   RAFAEL: How many times in the past year have you...    Used an illegal drug or used a prescription medication for non-medical reasons? Never Filed at: 10/16/2024 1654                            History of Present Illness       Chief Complaint   Patient presents with    Personal Problem     Patient has a hydrocele and had an ultrasound and told to come to the ED had pain on right testicle.        Past Medical History:   Diagnosis Date    Disease of thyroid gland       Past Surgical History:   Procedure Laterality Date    NO PAST SURGERIES        Family History   Problem Relation Age of Onset    No Known Problems Mother     No Known Problems Father       Social History     Tobacco Use    Smoking status: Every Day     Current packs/day: 0.50     Types: Cigarettes    Smokeless tobacco: Never   Vaping Use    Vaping status: Never Used   Substance Use Topics    Alcohol use: Yes     Comment:  occasionally     Drug use: Not Currently      E-Cigarette/Vaping    E-Cigarette Use Never User       E-Cigarette/Vaping Substances      I have reviewed and agree with the history as documented.     The patient is a 39-year-old male presents today with a complaint of right testicular swelling and pain.  The patient states he has had a hydrocele since he was 14.  Patient states over the last few days its become more painful.  Patient had an outpatient ultrasound today and was referred to come to the ER.  The patient's ultrasound results was concerning for a large hydrocele and concern for possible infection versus intermittent torsion.  Patient on examination had pain in the right testicle.  Patient states it has been constant.  Patient did have blood flow on the ultrasound however.  Ultrasound earlier today did recommend urology consult.  Denies any fevers chills penile discharge rash abdominal pain nausea vomiting diarrhea          Review of Systems   All other systems reviewed and are negative.          Objective       ED Triage Vitals [10/16/24 1652]   Temperature Pulse Blood Pressure Respirations SpO2 Patient Position - Orthostatic VS   97.7 °F (36.5 °C) 84 144/97 16 98 % Sitting      Temp Source Heart Rate Source BP Location FiO2 (%) Pain Score    Temporal Monitor Left arm -- 7      Vitals      Date and Time Temp Pulse SpO2 Resp BP Pain Score FACES Pain Rating User   10/16/24 2220 -- 81 98 % 16 138/108 4 -- NB   10/16/24 1804 -- -- -- -- -- 7 -- SR   10/16/24 1652 97.7 °F (36.5 °C) 84 98 % 16 144/97 7 -- BF            Physical Exam  Vitals and nursing note reviewed. Exam conducted with a chaperone present.   Constitutional:       General: He is not in acute distress.     Appearance: He is well-developed.   HENT:      Head: Normocephalic and atraumatic.   Eyes:      Extraocular Movements: Extraocular movements intact.      Pupils: Pupils are equal, round, and reactive to light.   Cardiovascular:      Rate and  Rhythm: Normal rate and regular rhythm.      Heart sounds: No murmur heard.  Pulmonary:      Effort: Pulmonary effort is normal. No respiratory distress.      Breath sounds: Normal breath sounds.   Abdominal:      General: Bowel sounds are normal.      Palpations: Abdomen is soft.      Tenderness: There is no abdominal tenderness.   Genitourinary:     Penis: Normal.       Testes:         Right: Tenderness, swelling and testicular hydrocele present.   Musculoskeletal:      Cervical back: Normal range of motion.   Skin:     General: Skin is warm and dry.      Capillary Refill: Capillary refill takes less than 2 seconds.   Neurological:      General: No focal deficit present.      Mental Status: He is alert and oriented to person, place, and time.   Psychiatric:         Behavior: Behavior normal.         Results Reviewed       Procedure Component Value Units Date/Time    Comprehensive metabolic panel [[id number]] Collected: 10/16/24 1804    Lab Status: Final result Specimen: Blood from Arm, Left Updated: 10/16/24 1832     Sodium 137 mmol/L      Potassium 4.3 mmol/L      Chloride 103 mmol/L      CO2 29 mmol/L      ANION GAP 5 mmol/L      BUN 15 mg/dL      Creatinine 0.98 mg/dL      Glucose 90 mg/dL      Calcium 9.3 mg/dL      AST 18 U/L      ALT 20 U/L      Alkaline Phosphatase 68 U/L      Total Protein 7.4 g/dL      Albumin 4.6 g/dL      Total Bilirubin 0.40 mg/dL      eGFR 95 ml/min/1.73sq m     Narrative:      [employer] guidelines for Chronic Kidney Disease (CKD):     Stage 1 with normal or high GFR (GFR > 90 mL/min/1.73 square meters)    Stage 2 Mild CKD (GFR = 60-89 mL/min/1.73 square meters)    Stage 3A Moderate CKD (GFR = 45-59 mL/min/1.73 square meters)    Stage 3B Moderate CKD (GFR = 30-44 mL/min/1.73 square meters)    Stage 4 Severe CKD (GFR = 15-29 mL/min/1.73 square meters)    Stage 5 End Stage CKD (GFR <15 mL/min/1.73 square meters)  Note: GFR calculation is accurate only with a  steady state creatinine    Lactic acid, plasma (w/reflex if result > 2.0) [[id number]]  (Normal) Collected: 10/16/24 1804    Lab Status: Final result Specimen: Blood from Arm, Left Updated: 10/16/24 1831     LACTIC ACID 0.6 mmol/L     Narrative:      Result may be elevated if tourniquet was used during collection.    UA w Reflex to Microscopic w Reflex to Culture [[id number]] Collected: 10/16/24 1815    Lab Status: Final result Specimen: Urine, Clean Catch Updated: 10/16/24 1823     Color, UA Yellow     Clarity, UA Clear     Specific Gravity, UA 1.020     pH, UA 6.5     Leukocytes, UA Negative     Nitrite, UA Negative     Protein, UA Negative mg/dl      Glucose, UA Negative mg/dl      Ketones, UA Negative mg/dl      Urobilinogen, UA 0.2 E.U./dl      Bilirubin, UA Negative     Occult Blood, UA Negative    CBC and differential [[id number]] Collected: 10/16/24 1804    Lab Status: Final result Specimen: Blood from Arm, Left Updated: 10/16/24 1814     WBC 6.51 Thousand/uL      RBC 5.30 Million/uL      Hemoglobin 15.8 g/dL      Hematocrit 47.5 %      MCV 90 fL      MCH 29.8 pg      MCHC 33.3 g/dL      RDW 12.2 %      MPV 10.2 fL      Platelets 227 Thousands/uL      nRBC 0 /100 WBCs      Segmented % 61 %      Immature Grans % 0 %      Lymphocytes % 27 %      Monocytes % 10 %      Eosinophils Relative 1 %      Basophils Relative 1 %      Absolute Neutrophils 3.95 Thousands/µL      Absolute Immature Grans 0.02 Thousand/uL      Absolute Lymphocytes 1.73 Thousands/µL      Absolute Monocytes 0.68 Thousand/µL      Eosinophils Absolute 0.06 Thousand/µL      Basophils Absolute 0.07 Thousands/µL             CT pelvis w contrast   Final Interpretation by E. Alec Schoenberger, MD (10/16 2009)      Large right hydrocele. Please refer to earlier scrotal ultrasound for further discussion   No acute pathology in the pelvis. No inguinal hernia      Workstation performed: [id number]             Procedures    ED Medication and Procedure  Management   Prior to Admission Medications   Prescriptions Last Dose Informant Patient Reported? Taking?   Multiple Vitamins-Iron TABS 10/16/2024  Yes Yes   Sig: Take by mouth   Synthroid 112 MCG tablet 10/16/2024  Yes Yes   Sig: TAKE 1 TABLET BY MOUTH EVERY DAY AT LEAST 30 MIN BEFORE BREAKFAST OR OTHER MEDICATION      Facility-Administered Medications: None     Patient's Medications   Discharge Prescriptions    OXYCODONE-ACETAMINOPHEN (PERCOCET) 5-325 MG PER TABLET    Take 1 tablet by mouth every 6 (six) hours as needed for severe pain Max Daily Amount: 4 tablets       Start Date: 10/16/2024End Date: --       Order Dose: 1 tablet       Quantity: 12 tablet    Refills: 0    SULFAMETHOXAZOLE-TRIMETHOPRIM (BACTRIM DS) 800-160 MG PER TABLET    Take 1 tablet by mouth 2 (two) times a day for 7 days smx-tmp DS (BACTRIM) 800-160 mg tabs (1tab q12 D10)       Start Date: 10/16/2024End Date: 10/23/2024       Order Dose: 1 tablet       Quantity: 14 tablet    Refills: 0       ED SEPSIS DOCUMENTATION   Time reflects when diagnosis was documented in both MDM as applicable and the Disposition within this note       Time User Action Codes Description Comment    10/16/2024 10:00 PM Neptali Baeza [N43.3] Hydrocele, unspecified hydrocele type     10/16/2024 10:01 PM Neptali Baeza [N50.811] Testicular pain, right     10/16/2024 10:19 PM Neptali Baeza [N45.1] Epididymitis                  Neptali Baeza PA-C  10/16/24 9408

## 2024-10-17 NOTE — ED NOTES
Patient made aware urology as en route to hospital to see him.      Meron Miranda, RN  10/16/24 8949

## 2024-11-19 RX ORDER — FLUTICASONE PROPIONATE 50 MCG
1 SPRAY, SUSPENSION (ML) NASAL DAILY
COMMUNITY
Start: 2024-10-08

## 2024-11-19 RX ORDER — BUSPIRONE HYDROCHLORIDE 10 MG/1
10 TABLET ORAL 2 TIMES DAILY
COMMUNITY
Start: 2024-09-13

## 2024-11-22 ENCOUNTER — OFFICE VISIT (OUTPATIENT)
Dept: UROLOGY | Facility: CLINIC | Age: 39
End: 2024-11-22
Payer: COMMERCIAL

## 2024-11-22 VITALS
HEART RATE: 72 BPM | OXYGEN SATURATION: 100 % | SYSTOLIC BLOOD PRESSURE: 122 MMHG | DIASTOLIC BLOOD PRESSURE: 82 MMHG | WEIGHT: 159.4 LBS | TEMPERATURE: 97.4 F | BODY MASS INDEX: 29.33 KG/M2 | HEIGHT: 62 IN

## 2024-11-22 DIAGNOSIS — N43.3 HYDROCELE, UNSPECIFIED HYDROCELE TYPE: ICD-10-CM

## 2024-11-22 DIAGNOSIS — N45.1 EPIDIDYMITIS: ICD-10-CM

## 2024-11-22 DIAGNOSIS — N50.811 TESTICULAR PAIN, RIGHT: ICD-10-CM

## 2024-11-22 PROCEDURE — 99214 OFFICE O/P EST MOD 30 MIN: CPT | Performed by: UROLOGY

## 2024-11-22 NOTE — PROGRESS NOTES
UROLOGY PROGRESS NOTE         NAME: Renaldo Mascorro  AGE: 39 y.o. SEX: male  : 1985   MRN: [id number]    DATE: 2024  TIME: 10:35 AM    Assessment and Plan      Impression:   1. Hydrocele, unspecified hydrocele type  -     Ambulatory Referral to Urology  2. Testicular pain, right  -     Ambulatory Referral to Urology  3. Epididymitis  -     Ambulatory Referral to Urology    Pros and cons of hydrocelectomy discussed.  He will consider he would like to follow-up in a few months and make a decision.   Plan: Follow-up few months      Chief Complaint     Chief Complaint   Patient presents with    New Patient Visit     Hydrocele-slight pain, has had since he was 14. Never drained previously.      History of Present Illness     HPI: Renaldo Mascorro is a 39 y.o. year old male who presents with patient developed hydrocele at age 14.  He has had some intermittent trouble.  Lately he got bumped in that area inadvertently and had significant pain and went to the emergency room.  CT scan and ultrasound reviewed.  Patient is a large right hydrocele.  His symptoms have improved since that episode but continues to have some bothersome symptoms with sexual relations.  He does his best to protect the area.  We did review his CAT scan and ultrasound together.  He will consider having hydrocelectomy at his discretion.  He like to think about this and return in .              The following portions of the patient's history were reviewed and updated as appropriate: allergies, current medications, past family history, past medical history, past social history, past surgical history and problem list.  Past Medical History:   Diagnosis Date    Disease of thyroid gland     Epididymitis     Hydrocele     Testicular pain     right     Past Surgical History:   Procedure Laterality Date    NO PAST SURGERIES       shoulder  Review of Systems     Const: Denies chills, fever and weight loss.  CV: Denies chest  "pain.  Resp: Denies SOB.  GI: Denies abdominal pain, nausea and vomiting.  : Denies symptoms other than stated above.  Musculo: Denies back pain.    Objective   /82   Pulse 72   Temp (!) 97.4 °F (36.3 °C)   Ht 5' 2\" (1.575 m)   Wt 72.3 kg (159 lb 6.4 oz)   SpO2 100%   BMI 29.15 kg/m²     Physical Exam  Const: Appears healthy and well developed. No signs of acute distress present.  Resp: Respirations are regular and unlabored.   CV: Rate is regular. Rhythm is regular.  Abdomen: Abdomen is soft, nontender, and nondistended. Kidneys are not palpable.  : Testicles normal.  Large right hydrocele.  Penis normal.  No hernias present.  Psych: Patient's attitude is cooperative. Mood is normal. Affect is normal.    Current Medications     Current Outpatient Medications:     Bioflavonoid Products (Vitamin C) CHEW, Chew 1 tablet daily, Disp: , Rfl:     busPIRone (BUSPAR) 10 mg tablet, Take 10 mg by mouth 2 (two) times a day, Disp: , Rfl:     Cholecalciferol 50 MCG (2000 UT) TABS, Take 6,000 Units by mouth daily, Disp: , Rfl:     fluticasone (FLONASE) 50 mcg/act nasal spray, 1 spray into each nostril daily, Disp: , Rfl:     Multiple Vitamins-Iron TABS, Take by mouth, Disp: , Rfl:     oxyCODONE-acetaminophen (Percocet) 5-325 mg per tablet, Take 1 tablet by mouth every 6 (six) hours as needed for severe pain Max Daily Amount: 4 tablets, Disp: 12 tablet, Rfl: 0    Synthroid 112 MCG tablet, TAKE 1 TABLET BY MOUTH EVERY DAY AT LEAST 30 MIN BEFORE BREAKFAST OR OTHER MEDICATION, Disp: , Rfl:         Frank D'Amico, MD        "

## 2025-01-05 ENCOUNTER — OFFICE VISIT (OUTPATIENT)
Dept: URGENT CARE | Facility: CLINIC | Age: 40
End: 2025-01-05
Payer: COMMERCIAL

## 2025-01-05 VITALS
SYSTOLIC BLOOD PRESSURE: 154 MMHG | WEIGHT: 150 LBS | BODY MASS INDEX: 27.6 KG/M2 | OXYGEN SATURATION: 98 % | DIASTOLIC BLOOD PRESSURE: 88 MMHG | HEART RATE: 84 BPM | TEMPERATURE: 96.8 F | RESPIRATION RATE: 18 BRPM | HEIGHT: 62 IN

## 2025-01-05 DIAGNOSIS — R09.89 CHEST CONGESTION: ICD-10-CM

## 2025-01-05 DIAGNOSIS — J01.90 ACUTE SINUSITIS, RECURRENCE NOT SPECIFIED, UNSPECIFIED LOCATION: Primary | ICD-10-CM

## 2025-01-05 PROCEDURE — G0382 LEV 3 HOSP TYPE B ED VISIT: HCPCS

## 2025-01-05 RX ORDER — ALBUTEROL SULFATE 90 UG/1
2 INHALANT RESPIRATORY (INHALATION) EVERY 6 HOURS PRN
Qty: 8.5 G | Refills: 0 | Status: SHIPPED | OUTPATIENT
Start: 2025-01-05

## 2025-01-05 RX ORDER — AZITHROMYCIN 250 MG/1
TABLET, FILM COATED ORAL
Qty: 6 TABLET | Refills: 0 | Status: SHIPPED | OUTPATIENT
Start: 2025-01-05 | End: 2025-01-09

## 2025-01-05 NOTE — PROGRESS NOTES
"Name: Renaldo Mascorro      : 1985      MRN: [id number]  Encounter Provider: Lani Agosto PA-C  Encounter Date: 2025   Encounter department: [facility]  :  Assessment & Plan  Acute sinusitis, recurrence not specified, unspecified location    Orders:    albuterol (ProAir HFA) 90 mcg/act inhaler; Inhale 2 puffs every 6 (six) hours as needed for wheezing    azithromycin (ZITHROMAX) 250 mg tablet; Take 2 tablets today then 1 tablet daily x 4 days    Chest congestion    Orders:    albuterol (ProAir HFA) 90 mcg/act inhaler; Inhale 2 puffs every 6 (six) hours as needed for wheezing    azithromycin (ZITHROMAX) 250 mg tablet; Take 2 tablets today then 1 tablet daily x 4 days    Azithromycin sent for concern of sinus infection and albuterol sent for wheezing on exam and asthma hx. Knows return precautions for worsened symptoms. Otc mucus management reviewed.    History of Present Illness   HPI  Renaldo Mascorro is a 39 y.o. male who presents with Head congestion, sinus congestion, ear pain and cough x 1 week. Has asthma hx      Review of Systems   Constitutional:  Negative for fever.   HENT:  Positive for congestion, ear pain, postnasal drip, rhinorrhea and sinus pressure.    Respiratory:  Positive for cough.           Objective   /88   Pulse 84   Temp (!) 96.8 °F (36 °C) (Tympanic)   Resp 18   Ht 5' 2\" (1.575 m)   Wt 68 kg (150 lb)   SpO2 98%   BMI 27.44 kg/m²      Physical Exam  Constitutional:       Appearance: He is ill-appearing.   HENT:      Head: Normocephalic and atraumatic.      Ears:      Comments: B/l TM bulging and redness.     Nose: Congestion and rhinorrhea present.      Mouth/Throat:      Mouth: Mucous membranes are moist.      Pharynx: Posterior oropharyngeal erythema present.   Cardiovascular:      Rate and Rhythm: Normal rate.   Pulmonary:      Effort: Pulmonary effort is normal.      Breath sounds: Wheezing present.   Neurological:      Mental Status: He is " alert.